# Patient Record
Sex: MALE | Race: WHITE | NOT HISPANIC OR LATINO | Employment: OTHER | URBAN - METROPOLITAN AREA
[De-identification: names, ages, dates, MRNs, and addresses within clinical notes are randomized per-mention and may not be internally consistent; named-entity substitution may affect disease eponyms.]

---

## 2017-01-01 ENCOUNTER — HOSPITAL ENCOUNTER (OUTPATIENT)
Dept: PET IMAGING | Facility: HOSPITAL | Age: 77
Discharge: HOME | End: 2017-06-02
Attending: INTERNAL MEDICINE | Admitting: INTERNAL MEDICINE

## 2017-01-01 ENCOUNTER — SPECIALTY PHARMACY (OUTPATIENT)
Dept: ONCOLOGY | Facility: HOSPITAL | Age: 77
End: 2017-01-01

## 2017-01-01 ENCOUNTER — OFFICE VISIT (OUTPATIENT)
Dept: ONCOLOGY | Facility: CLINIC | Age: 77
End: 2017-01-01

## 2017-01-01 ENCOUNTER — EDUCATION (OUTPATIENT)
Dept: ONCOLOGY | Facility: HOSPITAL | Age: 77
End: 2017-01-01

## 2017-01-01 ENCOUNTER — TELEPHONE (OUTPATIENT)
Dept: ONCOLOGY | Facility: CLINIC | Age: 77
End: 2017-01-01

## 2017-01-01 ENCOUNTER — RESULTS ENCOUNTER (OUTPATIENT)
Dept: ONCOLOGY | Facility: CLINIC | Age: 77
End: 2017-01-01

## 2017-01-01 ENCOUNTER — HOSPITAL ENCOUNTER (OUTPATIENT)
Dept: PET IMAGING | Facility: HOSPITAL | Age: 77
Discharge: HOME | End: 2017-01-26
Admitting: NURSE PRACTITIONER

## 2017-01-01 ENCOUNTER — HOSPITAL ENCOUNTER (OUTPATIENT)
Dept: PET IMAGING | Facility: HOSPITAL | Age: 77
Discharge: HOME | End: 2017-01-26

## 2017-01-01 ENCOUNTER — HOSPITAL ENCOUNTER (OUTPATIENT)
Dept: PET IMAGING | Facility: HOSPITAL | Age: 77
Discharge: HOME | End: 2017-06-02
Attending: INTERNAL MEDICINE

## 2017-01-01 VITALS
WEIGHT: 288 LBS | HEIGHT: 74 IN | RESPIRATION RATE: 20 BRPM | BODY MASS INDEX: 36.96 KG/M2 | TEMPERATURE: 97.9 F | DIASTOLIC BLOOD PRESSURE: 82 MMHG | SYSTOLIC BLOOD PRESSURE: 165 MMHG | HEART RATE: 61 BPM

## 2017-01-01 VITALS
BODY MASS INDEX: 35.04 KG/M2 | SYSTOLIC BLOOD PRESSURE: 140 MMHG | WEIGHT: 273 LBS | HEART RATE: 81 BPM | HEIGHT: 74 IN | DIASTOLIC BLOOD PRESSURE: 65 MMHG | TEMPERATURE: 97.3 F | RESPIRATION RATE: 18 BRPM

## 2017-01-01 VITALS
HEART RATE: 64 BPM | TEMPERATURE: 97.5 F | RESPIRATION RATE: 16 BRPM | DIASTOLIC BLOOD PRESSURE: 93 MMHG | BODY MASS INDEX: 37.47 KG/M2 | WEIGHT: 292 LBS | HEIGHT: 74 IN | SYSTOLIC BLOOD PRESSURE: 178 MMHG

## 2017-01-01 VITALS
BODY MASS INDEX: 36.32 KG/M2 | TEMPERATURE: 97.3 F | SYSTOLIC BLOOD PRESSURE: 146 MMHG | RESPIRATION RATE: 20 BRPM | HEIGHT: 74 IN | HEART RATE: 66 BPM | DIASTOLIC BLOOD PRESSURE: 78 MMHG | WEIGHT: 283 LBS

## 2017-01-01 VITALS
HEART RATE: 66 BPM | DIASTOLIC BLOOD PRESSURE: 73 MMHG | WEIGHT: 287 LBS | TEMPERATURE: 98 F | RESPIRATION RATE: 20 BRPM | BODY MASS INDEX: 36.83 KG/M2 | HEIGHT: 74 IN | SYSTOLIC BLOOD PRESSURE: 153 MMHG

## 2017-01-01 DIAGNOSIS — C83.14 MANTLE CELL LYMPHOMA OF LYMPH NODES OF AXILLA (HCC): ICD-10-CM

## 2017-01-01 DIAGNOSIS — C83.12 MANTLE CELL LYMPHOMA OF INTRATHORACIC LYMPH NODES (HCC): ICD-10-CM

## 2017-01-01 DIAGNOSIS — C83.14 MANTLE CELL LYMPHOMA OF LYMPH NODES OF AXILLA (HCC): Primary | ICD-10-CM

## 2017-01-01 DIAGNOSIS — C83.12 MANTLE CELL LYMPHOMA OF INTRATHORACIC LYMPH NODES (HCC): Primary | ICD-10-CM

## 2017-01-01 DIAGNOSIS — R91.1 PULMONARY NODULE, RIGHT: ICD-10-CM

## 2017-01-01 DIAGNOSIS — Z51.81 ENCOUNTER FOR THERAPEUTIC DRUG LEVEL MONITORING: ICD-10-CM

## 2017-01-01 LAB
ALBUMIN SERPL-MCNC: 4.3 G/DL (ref 3.2–4.8)
ALBUMIN SERPL-MCNC: 4.3 G/DL (ref 3.5–4.8)
ALBUMIN/GLOB SERPL: 2 G/DL (ref 1.5–2.5)
ALBUMIN/GLOB SERPL: 2.3 {RATIO} (ref 1.2–2.2)
ALBUMIN/GLOB SERPL: 2.5 {RATIO} (ref 1.2–2.2)
ALBUMIN/GLOB SERPL: 2.5 {RATIO} (ref 1.2–2.2)
ALP SERPL-CCNC: 70 IU/L (ref 39–117)
ALP SERPL-CCNC: 76 IU/L (ref 39–117)
ALP SERPL-CCNC: 80 IU/L (ref 39–117)
ALP SERPL-CCNC: 91 U/L (ref 25–100)
ALT SERPL W P-5'-P-CCNC: 24 U/L (ref 7–40)
ALT SERPL-CCNC: 11 IU/L (ref 0–44)
ALT SERPL-CCNC: 11 IU/L (ref 0–44)
ALT SERPL-CCNC: 9 IU/L (ref 0–44)
AMBIG ABBREV CMP14 DEFAULT: NORMAL
AMBIG ABBREV CMP14 DEFAULT: NORMAL
ANION GAP SERPL CALCULATED.3IONS-SCNC: 10 MMOL/L (ref 3–11)
AST SERPL-CCNC: 12 IU/L (ref 0–40)
AST SERPL-CCNC: 13 IU/L (ref 0–40)
AST SERPL-CCNC: 16 IU/L (ref 0–40)
AST SERPL-CCNC: 22 U/L (ref 0–33)
BASOPHILS # BLD AUTO: 0 X10E3/UL (ref 0–0.2)
BASOPHILS # BLD AUTO: 0.02 10*3/MM3 (ref 0–0.2)
BASOPHILS NFR BLD AUTO: 0 %
BASOPHILS NFR BLD AUTO: 0.3 % (ref 0–1)
BILIRUB SERPL-MCNC: 1.7 MG/DL (ref 0–1.2)
BILIRUB SERPL-MCNC: 1.9 MG/DL (ref 0.3–1.2)
BILIRUB SERPL-MCNC: 2.2 MG/DL (ref 0–1.2)
BILIRUB SERPL-MCNC: 2.8 MG/DL (ref 0–1.2)
BUN BLD-MCNC: 15 MG/DL (ref 9–23)
BUN SERPL-MCNC: 12 MG/DL (ref 8–27)
BUN SERPL-MCNC: 14 MG/DL (ref 8–27)
BUN SERPL-MCNC: 17 MG/DL (ref 8–27)
BUN/CREAT SERPL: 11 (ref 10–24)
BUN/CREAT SERPL: 11 (ref 10–24)
BUN/CREAT SERPL: 15 (ref 10–24)
BUN/CREAT SERPL: 15 (ref 7–25)
CALCIUM SERPL-MCNC: 9.1 MG/DL (ref 8.6–10.2)
CALCIUM SERPL-MCNC: 9.2 MG/DL (ref 8.6–10.2)
CALCIUM SERPL-MCNC: 9.3 MG/DL (ref 8.6–10.2)
CALCIUM SPEC-SCNC: 9.7 MG/DL (ref 8.7–10.4)
CHLORIDE SERPL-SCNC: 100 MMOL/L (ref 96–106)
CHLORIDE SERPL-SCNC: 101 MMOL/L (ref 96–106)
CHLORIDE SERPL-SCNC: 101 MMOL/L (ref 96–106)
CHLORIDE SERPL-SCNC: 103 MMOL/L (ref 99–109)
CO2 SERPL-SCNC: 22 MMOL/L (ref 18–29)
CO2 SERPL-SCNC: 24 MMOL/L (ref 18–29)
CO2 SERPL-SCNC: 24 MMOL/L (ref 18–29)
CO2 SERPL-SCNC: 28 MMOL/L (ref 20–31)
CORTIS AM PEAK SERPL-MCNC: 10.6 UG/DL (ref 6.2–19.4)
CREAT BLD-MCNC: 1 MG/DL (ref 0.6–1.3)
CREAT SERPL-MCNC: 1.13 MG/DL (ref 0.76–1.27)
CREAT SERPL-MCNC: 1.14 MG/DL (ref 0.76–1.27)
CREAT SERPL-MCNC: 1.26 MG/DL (ref 0.76–1.27)
DEPRECATED RDW RBC AUTO: 50.2 FL (ref 37–54)
EOSINOPHIL # BLD AUTO: 0 X10E3/UL (ref 0–0.4)
EOSINOPHIL # BLD AUTO: 0.1 10*3/MM3 (ref 0.1–0.3)
EOSINOPHIL # BLD AUTO: 0.1 X10E3/UL (ref 0–0.4)
EOSINOPHIL # BLD AUTO: 0.1 X10E3/UL (ref 0–0.4)
EOSINOPHIL NFR BLD AUTO: 0 %
EOSINOPHIL NFR BLD AUTO: 1 %
EOSINOPHIL NFR BLD AUTO: 1 %
EOSINOPHIL NFR BLD AUTO: 1.3 % (ref 0–3)
ERYTHROCYTE [DISTWIDTH] IN BLOOD BY AUTOMATED COUNT: 14.2 % (ref 11.3–14.5)
ERYTHROCYTE [DISTWIDTH] IN BLOOD BY AUTOMATED COUNT: 14.5 % (ref 12.3–15.4)
ERYTHROCYTE [DISTWIDTH] IN BLOOD BY AUTOMATED COUNT: 14.7 % (ref 12.3–15.4)
ERYTHROCYTE [DISTWIDTH] IN BLOOD BY AUTOMATED COUNT: 14.7 % (ref 12.3–15.4)
GFR SERPL CREATININE-BSD FRML MDRD: 73 ML/MIN/1.73
GLOBULIN SER CALC-MCNC: 1.7 G/DL (ref 1.5–4.5)
GLOBULIN SER CALC-MCNC: 1.7 G/DL (ref 1.5–4.5)
GLOBULIN SER CALC-MCNC: 1.9 G/DL (ref 1.5–4.5)
GLOBULIN UR ELPH-MCNC: 2.1 GM/DL
GLUCOSE BLD-MCNC: 80 MG/DL (ref 70–100)
GLUCOSE BLDC GLUCOMTR-MCNC: 108 MG/DL (ref 70–130)
GLUCOSE BLDC GLUCOMTR-MCNC: 89 MG/DL (ref 70–130)
GLUCOSE SERPL-MCNC: 121 MG/DL (ref 65–99)
GLUCOSE SERPL-MCNC: 125 MG/DL (ref 65–99)
GLUCOSE SERPL-MCNC: 91 MG/DL (ref 65–99)
HCT VFR BLD AUTO: 43.8 % (ref 37.5–51)
HCT VFR BLD AUTO: 45 % (ref 37.5–51)
HCT VFR BLD AUTO: 47.3 % (ref 38.9–50.9)
HCT VFR BLD AUTO: 48.5 % (ref 37.5–51)
HGB BLD-MCNC: 15.2 G/DL (ref 12.6–17.7)
HGB BLD-MCNC: 15.7 G/DL (ref 13.1–17.5)
HGB BLD-MCNC: 16 G/DL (ref 12.6–17.7)
HGB BLD-MCNC: 16.3 G/DL (ref 12.6–17.7)
IMM GRANULOCYTES # BLD: 0 X10E3/UL (ref 0–0.1)
IMM GRANULOCYTES # BLD: 0 X10E3/UL (ref 0–0.1)
IMM GRANULOCYTES # BLD: 0.02 10*3/MM3 (ref 0–0.03)
IMM GRANULOCYTES # BLD: 0.1 X10E3/UL (ref 0–0.1)
IMM GRANULOCYTES NFR BLD: 0 %
IMM GRANULOCYTES NFR BLD: 0 %
IMM GRANULOCYTES NFR BLD: 0.3 % (ref 0–0.6)
IMM GRANULOCYTES NFR BLD: 1 %
LYMPHOCYTES # BLD AUTO: 0.55 10*3/MM3 (ref 0.6–4.8)
LYMPHOCYTES # BLD AUTO: 0.6 X10E3/UL (ref 0.7–3.1)
LYMPHOCYTES # BLD AUTO: 1 X10E3/UL (ref 0.7–3.1)
LYMPHOCYTES # BLD AUTO: 1 X10E3/UL (ref 0.7–3.1)
LYMPHOCYTES NFR BLD AUTO: 12 %
LYMPHOCYTES NFR BLD AUTO: 13 %
LYMPHOCYTES NFR BLD AUTO: 7 %
LYMPHOCYTES NFR BLD AUTO: 7 % (ref 24–44)
MCH RBC QN AUTO: 31.9 PG (ref 26.6–33)
MCH RBC QN AUTO: 32.1 PG (ref 26.6–33)
MCH RBC QN AUTO: 32.1 PG (ref 27–31)
MCH RBC QN AUTO: 32.8 PG (ref 26.6–33)
MCHC RBC AUTO-ENTMCNC: 33.2 G/DL (ref 32–36)
MCHC RBC AUTO-ENTMCNC: 33.6 G/DL (ref 31.5–35.7)
MCHC RBC AUTO-ENTMCNC: 34.7 G/DL (ref 31.5–35.7)
MCHC RBC AUTO-ENTMCNC: 35.6 G/DL (ref 31.5–35.7)
MCV RBC AUTO: 92 FL (ref 79–97)
MCV RBC AUTO: 92 FL (ref 79–97)
MCV RBC AUTO: 96 FL (ref 79–97)
MCV RBC AUTO: 96.7 FL (ref 80–99)
MONOCYTES # BLD AUTO: 0.2 X10E3/UL (ref 0.1–0.9)
MONOCYTES # BLD AUTO: 0.3 X10E3/UL (ref 0.1–0.9)
MONOCYTES # BLD AUTO: 0.56 10*3/MM3 (ref 0–1)
MONOCYTES # BLD AUTO: 0.6 X10E3/UL (ref 0.1–0.9)
MONOCYTES NFR BLD AUTO: 2 %
MONOCYTES NFR BLD AUTO: 4 %
MONOCYTES NFR BLD AUTO: 7 %
MONOCYTES NFR BLD AUTO: 7.1 % (ref 0–12)
MORPHOLOGY BLD-IMP: (no result)
NEUTROPHILS # BLD AUTO: 6.2 X10E3/UL (ref 1.4–7)
NEUTROPHILS # BLD AUTO: 6.6 10*3/MM3 (ref 1.5–8.3)
NEUTROPHILS # BLD AUTO: 7.2 X10E3/UL (ref 1.4–7)
NEUTROPHILS # BLD AUTO: 7.3 X10E3/UL (ref 1.4–7)
NEUTROPHILS NFR BLD AUTO: 83 %
NEUTROPHILS NFR BLD AUTO: 84 %
NEUTROPHILS NFR BLD AUTO: 84 % (ref 41–71)
NEUTROPHILS NFR BLD AUTO: 85 %
PLATELET # BLD AUTO: 123 X10E3/UL (ref 150–379)
PLATELET # BLD AUTO: 125 X10E3/UL (ref 150–379)
PLATELET # BLD AUTO: 126 X10E3/UL (ref 150–379)
PLATELET # BLD AUTO: 160 10*3/MM3 (ref 150–450)
PMV BLD AUTO: 10 FL (ref 6–12)
POTASSIUM BLD-SCNC: 4.4 MMOL/L (ref 3.5–5.5)
POTASSIUM SERPL-SCNC: 3.9 MMOL/L (ref 3.5–5.2)
POTASSIUM SERPL-SCNC: 4 MMOL/L (ref 3.5–5.2)
POTASSIUM SERPL-SCNC: 4.2 MMOL/L (ref 3.5–5.2)
PROT SERPL-MCNC: 6 G/DL (ref 6–8.5)
PROT SERPL-MCNC: 6 G/DL (ref 6–8.5)
PROT SERPL-MCNC: 6.2 G/DL (ref 6–8.5)
PROT SERPL-MCNC: 6.4 G/DL (ref 5.7–8.2)
RBC # BLD AUTO: 4.76 X10E6/UL (ref 4.14–5.8)
RBC # BLD AUTO: 4.88 X10E6/UL (ref 4.14–5.8)
RBC # BLD AUTO: 4.89 10*6/MM3 (ref 4.2–5.76)
RBC # BLD AUTO: 5.07 X10E6/UL (ref 4.14–5.8)
SODIUM BLD-SCNC: 141 MMOL/L (ref 132–146)
SODIUM SERPL-SCNC: 142 MMOL/L (ref 134–144)
SODIUM SERPL-SCNC: 143 MMOL/L (ref 134–144)
SODIUM SERPL-SCNC: 144 MMOL/L (ref 134–144)
TSH SERPL DL<=0.005 MIU/L-ACNC: 10.96 UIU/ML (ref 0.45–4.5)
TSH SERPL DL<=0.005 MIU/L-ACNC: 7.09 UIU/ML (ref 0.45–4.5)
WBC # BLD AUTO: 7.4 X10E3/UL (ref 3.4–10.8)
WBC # BLD AUTO: 8.5 X10E3/UL (ref 3.4–10.8)
WBC # BLD AUTO: 8.8 X10E3/UL (ref 3.4–10.8)
WBC NRBC COR # BLD: 7.85 10*3/MM3 (ref 3.5–10.8)

## 2017-01-01 PROCEDURE — 78816 PET IMAGE W/CT FULL BODY: CPT

## 2017-01-01 PROCEDURE — 99214 OFFICE O/P EST MOD 30 MIN: CPT | Performed by: INTERNAL MEDICINE

## 2017-01-01 PROCEDURE — A9552 F18 FDG: HCPCS | Performed by: INTERNAL MEDICINE

## 2017-01-01 PROCEDURE — 82962 GLUCOSE BLOOD TEST: CPT

## 2017-01-01 PROCEDURE — 85025 COMPLETE CBC W/AUTO DIFF WBC: CPT | Performed by: INTERNAL MEDICINE

## 2017-01-01 PROCEDURE — 78815 PET IMAGE W/CT SKULL-THIGH: CPT

## 2017-01-01 PROCEDURE — A9552 F18 FDG: HCPCS | Performed by: NURSE PRACTITIONER

## 2017-01-01 PROCEDURE — 0 FLUDEOXYGLUCOSE F18 SOLUTION: Performed by: NURSE PRACTITIONER

## 2017-01-01 PROCEDURE — 99214 OFFICE O/P EST MOD 30 MIN: CPT | Performed by: NURSE PRACTITIONER

## 2017-01-01 PROCEDURE — 80053 COMPREHEN METABOLIC PANEL: CPT | Performed by: INTERNAL MEDICINE

## 2017-01-01 PROCEDURE — 0 FLUDEOXYGLUCOSE F18 SOLUTION: Performed by: INTERNAL MEDICINE

## 2017-01-01 RX ORDER — ONDANSETRON HYDROCHLORIDE 8 MG/1
8 TABLET, FILM COATED ORAL EVERY 8 HOURS PRN
Qty: 30 TABLET | Refills: 5 | Status: SHIPPED | OUTPATIENT
Start: 2017-01-01

## 2017-01-01 RX ORDER — DIPHENHYDRAMINE, LIDOCAINE, NYSTATIN
KIT ORAL
Qty: 240 ML | Refills: 3 | Status: SHIPPED | OUTPATIENT
Start: 2017-01-01

## 2017-01-01 RX ADMIN — FLUDEOXYGLUCOSE F18 1 DOSE: 300 INJECTION INTRAVENOUS at 09:29

## 2017-01-01 RX ADMIN — FLUDEOXYGLUCOSE F18 1 DOSE: 300 INJECTION INTRAVENOUS at 12:18

## 2017-01-18 NOTE — TELEPHONE ENCOUNTER
----- Message from Stephanie Buchanan sent at 1/18/2017  2:30 PM EST -----  Regarding: nasir perales/ lab work  Contact: [phone]  Please call.  When is the lab work to be done?  During the scan?  Prior to coming in to the office?  Or in office prior to office visit.  He is very concerned.

## 2017-01-18 NOTE — TELEPHONE ENCOUNTER
Spoke with  Paul. He was confused about when he should have his lab work done. The paperwork he was given did have a written date the same day as his scheduled PET scan. I told him that he should have his lab drawn before the PET scan as sometimes the radiology dept needs those results prior to any testing. He does know that his next office appt is 2/2/17. He will call if any questions or concerns.

## 2017-02-02 NOTE — PROGRESS NOTES
CHIEF COMPLAINT: Vertex scalp lesion    Problem List:  Oncology/Hematology History    1. Stage IV mantle cell lymphoma: PET scan 01/28/2015 showing diffuse  extensive adenopathy from the lower neck to the proximal thighs with massive  enlargement of the spleen as well. Bone marrow shows nodular involvement of  about 33% of the bone marrow. Echocardiogram 02/02/2015 ejection fraction  greater then 55%. Colonoscopy 02/03/2015 revealed diverticulosis and some  internal hemorrhoids but no other abnormalities. Left axillary node core biopsy  01/14/2015 diagnostic for pleomorphic variant of mantle cell lymphoma.   a) Started bendamustine and Rituxan 02/05/2015, day 2 on 02/06/2015.  b) After 2 courses of bendamustine and Rituxan PET scan of 03/20/2015 showed  virtual complete remission save for a new very small 1 cm nodule posterior  medial right mid to lower lung slightly hypermetabolic but below malignant  thresholds and could be inflammatory, and the rest of his disease is  essentially gone. Saw Dr. Key and while not dismissing the possibility of  transplant out of hand in general it would be a relatively high-risk situation  in this 74-year-old, and the patient, after hearing in great detail would be  involved with the transplant, does not want to consider that.   c) PET scan 06/02/2015 following 4 courses of bendamustine and Rituxan  entirely negative.   d) Repeat bone marrow 06/05/2015 showed no evidence of residual disease.  e) Restaging CT of the neck, chest, abdomen, and pelvis 02/24/2016 showed some  sinusitis and cholelithiasis but no lymphoma. Hemoglobin 16.3, with white count  9200, platelets 155,000, with normal differential, and creatinine of 1.1, GFR  greater than 59, AST 25, ALT 51, alkaline phosphatase 103, and normal  Electrolytes.  f.)  CAT scans October 2016 negative despite better complaints of diffuse bone pain on maintenance Rituxan.  October 2016 PET scan also negative for malignancy.  Small  nodule in the lung base that needs to be followed.  This was seen on prior imaging as mentioned above.  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  10/24/2014 bone marrow biopsy was negative with normal cellular bone marrow with maturing trilineage hematopoiesis.  No morphologic, immunohistochemical or flow cytometric evidence of lymphoma.  Cytogenetics revealed normal male karyotype.         HISTORY OF PRESENT ILLNESS:  The patient is a 76 y.o. male, here for follow up on management of mantle cell lymphoma.  Vertex scalp lesion is the only thing visible on current PET scanning images which I reviewed and discussed with the patient today.  He's not had blood work done recently.  Otherwise feeling fine now that he is off the Rituxan.      Past Medical History   Diagnosis Date   • Aches      Smokes marijuana to help with chronic insomnia and aches that occur after the Rituxan   • Cataract      Hx of cataracts.   • Chronic bronchitis    • Chronic insomnia      Smokes marijuana to help with chronic insomnia and aches that occur after the Rituxan. Has tried sleeping medicines and they do not help.Marijuana helps, self medicates.   • Disease of thyroid gland    • Glaucoma      And therefore cannot take Benadryl   • History of hypothyroidism      On Synthroid   • Hx of renal insufficiency syndrome    • Hyperlipidemia    • Hypertension      Hx of   • Mantle cell lymphoma      Axilla and upper limb   • Myocardial infarction      Apparent silent MI in the past according to patient's  report to him of prior EKGs and cardiac cath showing no need for stenting but diffuse less than 50% narrowing.   • PVD (peripheral vascular disease)      With gangrene right hand after right brachial vascular surgery bypass with subsequent redo of that bypass in [place] in 1995.   • Renal insufficiency      Hx of. With current normal renal function pretreatment.   • Sinus congestion      Occasional without fevers or chills     Past Surgical History   Procedure Laterality Date   • Colonoscopy     • Bone marrow biopsy     • Tonsillectomy     • Vascular surgery Right 1995     Brachial vascular surgery bypass with subsequent redo of that bypass in [place] in 1995   • Hand surgery Left      Hx of left hand reattachment, traumatic removal, and subsequently reattachment.       Allergies   Allergen Reactions   • Antihistamines, Diphenhydramine-Type    • Benadryl [Diphenhydramine Hcl (Sleep)]      Cannot take Benadryl or many other sleeping meds because of glaucoma.   • Codeine    • Diphenhydramine        Family History and Social History reviewed and changed as necessary      REVIEW OF SYSTEM:   Review of Systems   Constitutional: Negative for appetite change, chills, diaphoresis, fatigue, fever and unexpected weight change.   HENT:   Negative for mouth sores, sore throat and trouble swallowing.    Eyes: Negative for icterus.   Respiratory: Negative for cough, hemoptysis and shortness of breath.    Cardiovascular: Negative for chest pain, leg swelling and palpitations.   Gastrointestinal: Negative for abdominal distention, abdominal pain, blood in stool, constipation, diarrhea, nausea and vomiting.   Endocrine: Negative for hot flashes.   Genitourinary: Negative for bladder incontinence, difficulty urinating, dysuria, frequency and hematuria.    Musculoskeletal: Negative for gait problem, neck pain and neck stiffness.   Skin: Negative for rash.   Neurological: Negative for dizziness, gait problem, headaches,  "light-headedness and numbness.   Hematological: Negative for adenopathy. Does not bruise/bleed easily.   Psychiatric/Behavioral: Negative for depression. The patient is not nervous/anxious.    All other systems reviewed and are negative.       PHYSICAL EXAM    Vitals:    02/02/17 1326   BP: 140/65   Pulse: 81   Resp: 18   Temp: 97.3 °F (36.3 °C)   Weight: 273 lb (124 kg)   Height: 73.5\" (186.7 cm)     Constitutional: Appears well-developed and well-nourished. No distress.   ECOG: (1) Restricted in physically strenuous activity, ambulatory and able to do work of light nature  HENT:   Head: Normocephalic.   Mouth/Throat: Oropharynx is clear and moist.   Eyes: Conjunctivae are normal. Pupils are equal, round, and reactive to light. No scleral icterus.   Neck: Neck supple. No JVD present. No thyromegaly present.   Cardiovascular: Normal rate, regular rhythm and normal heart sounds.    Pulmonary/Chest: Breath sounds normal. No respiratory distress.   Abdominal: Soft. Exhibits no distension and no mass. There is no hepatosplenomegaly. There is no tenderness. There is no rebound and no guarding.   Musculoskeletal:Exhibits no edema, tenderness or deformity.   Neurological: Alert and oriented to person, place, and time. Exhibits normal muscle tone.   Skin: No ecchymosis, no petechiae and no rash noted. Not diaphoretic. No cyanosis. Nails show no clubbing.  Right of midline vertex scalp lesion about half the size of a ping-pong ball crusting and flaking with some central bleeding.    Psychiatric: Normal mood and affect.   Vitals reviewed.      Hospital Outpatient Visit on 01/26/2017   Component Date Value Ref Range Status   • Glucose 01/26/2017 108  70 - 130 mg/dL Final       Assessment/Plan     1.  Scalp lesion  2. Mantle cell lymphoma    Discussion: We'll get him to dermatology to get this large scalp lesion excised.  It is possible that this is lymphoma but I suspect not but nonetheless it needs to be removed and it is " growing in his significant in size.  Otherwise we'll plan on getting his blood count and chemistry today as well as prior to return in 4 months and check his PET scan prior to return in 4 months.  His fatigue is much better and his overall wall of the life and mood is dramatically improved off Rituxan.       Errors in dictation may reflect use of voice recognition software and not all errors in transcription may have been detected prior to signing.    Manuel Sosa MD    02/02/2017

## 2017-06-08 PROBLEM — C83.12: Status: ACTIVE | Noted: 2017-01-01

## 2017-06-08 NOTE — PROGRESS NOTES
CHIEF COMPLAINT: Follow-up for mantle cell lymphoma    Problem List:  Oncology/Hematology History    1. Stage IV mantle cell lymphoma: PET scan 01/28/2015 showing diffuse  extensive adenopathy from the lower neck to the proximal thighs with massive  enlargement of the spleen as well. Bone marrow shows nodular involvement of  about 33% of the bone marrow. Echocardiogram 02/02/2015 ejection fraction  greater then 55%. Colonoscopy 02/03/2015 revealed diverticulosis and some  internal hemorrhoids but no other abnormalities. Left axillary node core biopsy  01/14/2015 diagnostic for pleomorphic variant of mantle cell lymphoma.   a) Started bendamustine and Rituxan 02/05/2015, day 2 on 02/06/2015.  b) After 2 courses of bendamustine and Rituxan PET scan of 03/20/2015 showed  virtual complete remission save for a new very small 1 cm nodule posterior  medial right mid to lower lung slightly hypermetabolic but below malignant  thresholds and could be inflammatory, and the rest of his disease is  essentially gone. Saw Dr. Key and while not dismissing the possibility of  transplant out of hand in general it would be a relatively high-risk situation  in this 74-year-old, and the patient, after hearing in great detail would be  involved with the transplant, does not want to consider that.   c) PET scan 06/02/2015 following 4 courses of bendamustine and Rituxan  entirely negative.   d) Repeat bone marrow 06/05/2015 showed no evidence of residual disease.  e) Restaging CT of the neck, chest, abdomen, and pelvis 02/24/2016 showed some  sinusitis and cholelithiasis but no lymphoma. Hemoglobin 16.3, with white count  9200, platelets 155,000, with normal differential, and creatinine of 1.1, GFR  greater than 59, AST 25, ALT 51, alkaline phosphatase 103, and normal  Electrolytes.  f.)  CAT scans October 2016 negative despite bitter complaints of diffuse bone pain on maintenance Rituxan.  October 2016 PET scan also negative for  malignancy.  Small nodule in the lung base that needs to be followed.  This was seen on prior imaging as mentioned above.  G.) PET/CT 1/26/17 shows a small vertex right of midline scalp abnormality and otherwise no abnormalities including resolution of prior right lower lobe lung nodule  H.) PET/CT with progression of disease, new area of recurrence in the paraesophageal region and subcarinal region.    2.  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  10/24/2014 bone marrow biopsy was negative with normal cellular bone marrow with maturing trilineage hematopoiesis.  No morphologic, immunohistochemical or flow cytometric evidence of lymphoma.  Cytogenetics revealed normal male karyotype.      6/2/2017 Imaging    PET/CT IMPRESSION:  Progression of disease with new area of recurrence  identified in the paraesophageal region and subcarinal region where  there is a large hypermetabolic mass present. The remainder of the  PET/CT scan is stable and unremarkable.      6/2/2017 Progression    On PET/CT         HISTORY OF PRESENT ILLNESS:  The patient is a 76 y.o. male, here for follow up on management of mantle cell lymphoma.  The patient states that since we saw him last he has been feeling well.  He denies any new pain.  He states that he has been sleeping well.  He has an occasional mild course of night sweats but not very often and these are not drenching.  No fevers or chills, no illnesses or infections since we saw him last.  He is eating well, no change in his bowel or bladder habits.      Past Medical History:   Diagnosis Date   • Aches     Smokes marijuana to help with chronic insomnia and  aches that occur after the Rituxan   • Cataract     Hx of cataracts.   • Chronic bronchitis    • Chronic insomnia     Smokes marijuana to help with chronic insomnia and aches that occur after the Rituxan. Has tried sleeping medicines and they do not help.Marijuana helps, self medicates.   • Disease of thyroid gland    • Glaucoma     And therefore cannot take Benadryl   • History of hypothyroidism     On Synthroid   • Hx of renal insufficiency syndrome    • Hyperlipidemia    • Hypertension     Hx of   • Mantle cell lymphoma     Axilla and upper limb   • Myocardial infarction     Apparent silent MI in the past according to patient's report to him of prior EKGs and cardiac cath showing no need for stenting but diffuse less than 50% narrowing.   • PVD (peripheral vascular disease)     With gangrene right hand after right brachial vascular surgery bypass with subsequent redo of that bypass in [place] in 1995.   • Renal insufficiency     Hx of. With current normal renal function pretreatment.   • Sinus congestion     Occasional without fevers or chills     Past Surgical History:   Procedure Laterality Date   • BONE MARROW BIOPSY     • COLONOSCOPY     • HAND SURGERY Left     Hx of left hand reattachment, traumatic removal, and subsequently reattachment.   • TONSILLECTOMY     • VASCULAR SURGERY Right 1995    Brachial vascular surgery bypass with subsequent redo of that bypass in [place] in 1995       Allergies   Allergen Reactions   • Antihistamines, Diphenhydramine-Type    • Benadryl [Diphenhydramine Hcl (Sleep)]      Cannot take Benadryl or many other sleeping meds because of glaucoma.   • Codeine    • Diphenhydramine        Family History and Social History reviewed and changed as necessary      REVIEW OF SYSTEM:   Review of Systems   Constitutional: Negative for appetite change, chills, diaphoresis, fatigue, fever and unexpected weight change.   HENT:   Negative for mouth sores, sore throat and trouble swallowing.  "   Eyes: Negative for icterus.   Respiratory: Negative for cough, hemoptysis and shortness of breath.    Cardiovascular: Negative for chest pain, leg swelling and palpitations.   Gastrointestinal: Negative for abdominal distention, abdominal pain, blood in stool, constipation, diarrhea, nausea and vomiting.   Endocrine: Negative for hot flashes.   Genitourinary: Negative for bladder incontinence, difficulty urinating, dysuria, frequency and hematuria.    Musculoskeletal: Negative for gait problem, neck pain and neck stiffness.   Skin: Negative for rash.   Neurological: Negative for dizziness, gait problem, headaches, light-headedness and numbness.   Hematological: Negative for adenopathy. Does not bruise/bleed easily.   Psychiatric/Behavioral: Negative for depression. The patient is not nervous/anxious.    All other systems reviewed and are negative.       PHYSICAL EXAM    Vitals:    06/08/17 1336   BP: 153/73   Pulse: 66   Resp: 20   Temp: 98 °F (36.7 °C)   Weight: 287 lb (130 kg)   Height: 73.5\" (186.7 cm)     Constitutional: Appears well-developed and well-nourished. No distress.   ECOG: (0) Fully active, able to carry on all predisease performance without restriction  HENT:   Head: Normocephalic.   Mouth/Throat: Oropharynx is clear and moist.   Eyes: Conjunctivae are normal. Pupils are equal, round, and reactive to light. No scleral icterus.   Neck: Neck supple. No JVD present. No thyromegaly present.   Cardiovascular: Normal rate, regular rhythm and normal heart sounds.    Pulmonary/Chest: Breath sounds normal. No respiratory distress.   Abdominal: Soft. Exhibits no distension and no mass. There is no hepatosplenomegaly. There is no tenderness. There is no rebound and no guarding.   Musculoskeletal:Exhibits no edema, tenderness.   Nodes: All nodes are nonpalpable.    Neurological: Alert and oriented to person, place, and time.   Skin: No ecchymosis, no petechiae and no rash noted. Not diaphoretic. No cyanosis. " Nails show no clubbing, nails are thickened with fungus.   Psychiatric: Normal mood and affect.   Vitals reviewed.    Recent Results (from the past 168 hour(s))   POC Glucose Fingerstick    Collection Time: 06/02/17 12:11 PM   Result Value Ref Range    Glucose 89 70 - 130 mg/dL   Comprehensive Metabolic Panel    Collection Time: 06/02/17  3:29 PM   Result Value Ref Range    Glucose 80 70 - 100 mg/dL    BUN 15 9 - 23 mg/dL    Creatinine 1.00 0.60 - 1.30 mg/dL    Sodium 141 132 - 146 mmol/L    Potassium 4.4 3.5 - 5.5 mmol/L    Chloride 103 99 - 109 mmol/L    CO2 28.0 20.0 - 31.0 mmol/L    Calcium 9.7 8.7 - 10.4 mg/dL    Total Protein 6.4 5.7 - 8.2 g/dL    Albumin 4.30 3.20 - 4.80 g/dL    ALT (SGPT) 24 7 - 40 U/L    AST (SGOT) 22 0 - 33 U/L    Alkaline Phosphatase 91 25 - 100 U/L    Total Bilirubin 1.9 (H) 0.3 - 1.2 mg/dL    eGFR Non African Amer 73 >60 mL/min/1.73    Globulin 2.1 gm/dL    A/G Ratio 2.0 1.5 - 2.5 g/dL    BUN/Creatinine Ratio 15.0 7.0 - 25.0    Anion Gap 10.0 3.0 - 11.0 mmol/L   CBC Auto Differential    Collection Time: 06/02/17  3:29 PM   Result Value Ref Range    WBC 7.85 3.50 - 10.80 10*3/mm3    RBC 4.89 4.20 - 5.76 10*6/mm3    Hemoglobin 15.7 13.1 - 17.5 g/dL    Hematocrit 47.3 38.9 - 50.9 %    MCV 96.7 80.0 - 99.0 fL    MCH 32.1 (H) 27.0 - 31.0 pg    MCHC 33.2 32.0 - 36.0 g/dL    RDW 14.2 11.3 - 14.5 %    RDW-SD 50.2 37.0 - 54.0 fl    MPV 10.0 6.0 - 12.0 fL    Platelets 160 150 - 450 10*3/mm3    Neutrophil % 84.0 (H) 41.0 - 71.0 %    Lymphocyte % 7.0 (L) 24.0 - 44.0 %    Monocyte % 7.1 0.0 - 12.0 %    Eosinophil % 1.3 0.0 - 3.0 %    Basophil % 0.3 0.0 - 1.0 %    Immature Grans % 0.3 0.0 - 0.6 %    Neutrophils, Absolute 6.60 1.50 - 8.30 10*3/mm3    Lymphocytes, Absolute 0.55 (L) 0.60 - 4.80 10*3/mm3    Monocytes, Absolute 0.56 0.00 - 1.00 10*3/mm3    Eosinophils, Absolute 0.10 0.10 - 0.30 10*3/mm3    Basophils, Absolute 0.02 0.00 - 0.20 10*3/mm3    Immature Grans, Absolute 0.02 0.00 - 0.03 10*3/mm3          Assessment/Plan     1. Recurrent mantle cell lymphoma:  Patient's current PET/CT shows recurrence of disease in the paraesophageal/subcarinal region.  He is asymptomatic labs are unremarkable with no anemia or thrombocytopenia, white count is normal.  I will check his uric acid level and LDH today.  We discussed treatment options and will plan on beginning treatment in the next week or so with Ibrutinib 560 mg daily.  Side effects were discussed including but not limited to arrhythmias, diarrhea, neutropenia, infection, bleeding, nausea, fatigue.  He was provided written information and will also will be contacted by our doctorate of pharmacy for further education.  We will see him back in 1 week for follow-up and if he has the oral medication he can begin at that time, he will meet with Dr. Sosa to discuss this further as Dr. Sosa was out this week on vacation and the patient would like to meet with him in regards to his progression of disease.  I did discuss plan of care today with one of his partners, Dr. Malik Clarke.    2.  Squamous cell carcinoma scalp: This was removed in March with Mohs procedure.    20 minutes at today's 25 minute appointment was spent in counseling and education regarding the above and in coordination of care.  Edna Pelaez, APRN    06/08/2017

## 2017-06-15 NOTE — PROGRESS NOTES
CHIEF COMPLAINT: Mantle cell lymphoma    Problem List:  Oncology/Hematology History    1. Stage IV mantle cell lymphoma: PET scan 01/28/2015 showing diffuse  extensive adenopathy from the lower neck to the proximal thighs with massive  enlargement of the spleen as well. Bone marrow shows nodular involvement of  about 33% of the bone marrow. Echocardiogram 02/02/2015 ejection fraction  greater then 55%. Colonoscopy 02/03/2015 revealed diverticulosis and some  internal hemorrhoids but no other abnormalities. Left axillary node core biopsy  01/14/2015 diagnostic for pleomorphic variant of mantle cell lymphoma.   a) Started bendamustine and Rituxan 02/05/2015, day 2 on 02/06/2015.  b) After 2 courses of bendamustine and Rituxan PET scan of 03/20/2015 showed  virtual complete remission save for a new very small 1 cm nodule posterior  medial right mid to lower lung slightly hypermetabolic but below malignant  thresholds and could be inflammatory, and the rest of his disease is  essentially gone. Saw Dr. Key and while not dismissing the possibility of  transplant out of hand in general it would be a relatively high-risk situation  in this 74-year-old, and the patient, after hearing in great detail would be  involved with the transplant, does not want to consider that.   c) PET scan 06/02/2015 following 4 courses of bendamustine and Rituxan  entirely negative.   d) Repeat bone marrow 06/05/2015 showed no evidence of residual disease.  e) Restaging CT of the neck, chest, abdomen, and pelvis 02/24/2016 showed some  sinusitis and cholelithiasis but no lymphoma. Hemoglobin 16.3, with white count  9200, platelets 155,000, with normal differential, and creatinine of 1.1, GFR  greater than 59, AST 25, ALT 51, alkaline phosphatase 103, and normal  Electrolytes.  f.)  CAT scans October 2016 negative despite bitter complaints of diffuse bone pain on maintenance Rituxan.  October 2016 PET scan also negative for malignancy.  Small  nodule in the lung base that needs to be followed.  This was seen on prior imaging as mentioned above.  G.) PET/CT 1/26/17 shows a small vertex right of midline scalp abnormality and otherwise no abnormalities including resolution of prior right lower lobe lung nodule  H.) PET/CT with progression of disease, new area of recurrence in the paraesophageal region and subcarinal region.    2.  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  10/24/2014 bone marrow biopsy was negative with normal cellular bone marrow with maturing trilineage hematopoiesis.  No morphologic, immunohistochemical or flow cytometric evidence of lymphoma.  Cytogenetics revealed normal male karyotype.      6/2/2017 Imaging    PET/CT IMPRESSION:  Progression of disease with new area of recurrence  identified in the paraesophageal region and subcarinal region where  there is a large hypermetabolic mass present. The remainder of the  PET/CT scan is stable and unremarkable.      6/2/2017 Progression    On PET/CT         HISTORY OF PRESENT ILLNESS:  The patient is a 76 y.o. male, here for follow up on management of Recurrent mantle cell lymphoma.  He is asymptomatic.  He is still working on getting his ibrutinib coverage sorted out.  Past Medical History:   Diagnosis Date   • Aches     Smokes marijuana to help with chronic insomnia and aches that occur after the Rituxan   • Cataract     Hx of cataracts.   • Chronic bronchitis    • Chronic insomnia     Smokes marijuana to help with chronic insomnia and aches that occur after the Rituxan. Has tried sleeping medicines and they do not help.Marijuana helps, self medicates.   • Disease  of thyroid gland    • Glaucoma     And therefore cannot take Benadryl   • History of hypothyroidism     On Synthroid   • Hx of renal insufficiency syndrome    • Hyperlipidemia    • Hypertension     Hx of   • Mantle cell lymphoma     Axilla and upper limb   • Myocardial infarction     Apparent silent MI in the past according to patient's report to him of prior EKGs and cardiac cath showing no need for stenting but diffuse less than 50% narrowing.   • PVD (peripheral vascular disease)     With gangrene right hand after right brachial vascular surgery bypass with subsequent redo of that bypass in [place] in 1995.   • Renal insufficiency     Hx of. With current normal renal function pretreatment.   • Sinus congestion     Occasional without fevers or chills     Past Surgical History:   Procedure Laterality Date   • BONE MARROW BIOPSY     • COLONOSCOPY     • HAND SURGERY Left     Hx of left hand reattachment, traumatic removal, and subsequently reattachment.   • TONSILLECTOMY     • VASCULAR SURGERY Right 1995    Brachial vascular surgery bypass with subsequent redo of that bypass in [place] in 1995       Allergies   Allergen Reactions   • Antihistamines, Diphenhydramine-Type    • Benadryl [Diphenhydramine Hcl (Sleep)]      Cannot take Benadryl or many other sleeping meds because of glaucoma.   • Codeine    • Diphenhydramine        Family History and Social History reviewed and changed as necessary      REVIEW OF SYSTEM:   Review of Systems   Constitutional: Negative for appetite change, chills, diaphoresis, fatigue, fever and unexpected weight change.   HENT:   Negative for mouth sores, sore throat and trouble swallowing.    Eyes: Negative for icterus.   Respiratory: Negative for cough, hemoptysis and shortness of breath.    Cardiovascular: Negative for chest pain, leg swelling and palpitations.   Gastrointestinal: Negative for abdominal distention, abdominal pain, blood in stool, constipation, diarrhea, nausea and  "vomiting.   Endocrine: Negative for hot flashes.   Genitourinary: Negative for bladder incontinence, difficulty urinating, dysuria, frequency and hematuria.    Musculoskeletal: Negative for gait problem, neck pain and neck stiffness.   Skin: Negative for rash.   Neurological: Negative for dizziness, gait problem, headaches, light-headedness and numbness.   Hematological: Negative for adenopathy. Does not bruise/bleed easily.   Psychiatric/Behavioral: Negative for depression. The patient is not nervous/anxious.    All other systems reviewed and are negative.       PHYSICAL EXAM    Vitals:    06/15/17 1006   BP: 165/82   Pulse: 61   Resp: 20   Temp: 97.9 °F (36.6 °C)   Weight: 288 lb (131 kg)   Height: 73.5\" (186.7 cm)     Constitutional: Appears well-developed and well-nourished. No distress.   ECOG: (0) Fully active, able to carry on all predisease performance without restriction  HENT:   Head: Normocephalic.   Mouth/Throat: Oropharynx is clear and moist.   Eyes: Conjunctivae are normal. Pupils are equal, round, and reactive to light. No scleral icterus.   Neck: Neck supple. No JVD present. No thyromegaly present.   Cardiovascular: Normal rate, regular rhythm and normal heart sounds.    Pulmonary/Chest: Breath sounds normal. No respiratory distress.   Abdominal: Soft. Exhibits no distension and no mass. There is no hepatosplenomegaly. There is no tenderness. There is no rebound and no guarding.   Musculoskeletal:Exhibits no edema, tenderness or deformity.   Neurological: Alert and oriented to person, place, and time. Exhibits normal muscle tone.   Skin: No ecchymosis, no petechiae and no rash noted. Not diaphoretic. No cyanosis. Nails show no clubbing.   Psychiatric: Normal mood and affect.   Vitals reviewed.      Hospital Outpatient Visit on 06/02/2017   Component Date Value Ref Range Status   • Glucose 06/02/2017 80  70 - 100 mg/dL Final   • BUN 06/02/2017 15  9 - 23 mg/dL Final   • Creatinine 06/02/2017 1.00  " 0.60 - 1.30 mg/dL Final   • Sodium 06/02/2017 141  132 - 146 mmol/L Final   • Potassium 06/02/2017 4.4  3.5 - 5.5 mmol/L Final   • Chloride 06/02/2017 103  99 - 109 mmol/L Final   • CO2 06/02/2017 28.0  20.0 - 31.0 mmol/L Final   • Calcium 06/02/2017 9.7  8.7 - 10.4 mg/dL Final   • Total Protein 06/02/2017 6.4  5.7 - 8.2 g/dL Final   • Albumin 06/02/2017 4.30  3.20 - 4.80 g/dL Final   • ALT (SGPT) 06/02/2017 24  7 - 40 U/L Final   • AST (SGOT) 06/02/2017 22  0 - 33 U/L Final   • Alkaline Phosphatase 06/02/2017 91  25 - 100 U/L Final   • Total Bilirubin 06/02/2017 1.9* 0.3 - 1.2 mg/dL Final   • eGFR Non African Amer 06/02/2017 73  >60 mL/min/1.73 Final   • Globulin 06/02/2017 2.1  gm/dL Final   • A/G Ratio 06/02/2017 2.0  1.5 - 2.5 g/dL Final   • BUN/Creatinine Ratio 06/02/2017 15.0  7.0 - 25.0 Final   • Anion Gap 06/02/2017 10.0  3.0 - 11.0 mmol/L Final   • WBC 06/02/2017 7.85  3.50 - 10.80 10*3/mm3 Final   • RBC 06/02/2017 4.89  4.20 - 5.76 10*6/mm3 Final   • Hemoglobin 06/02/2017 15.7  13.1 - 17.5 g/dL Final   • Hematocrit 06/02/2017 47.3  38.9 - 50.9 % Final   • MCV 06/02/2017 96.7  80.0 - 99.0 fL Final   • MCH 06/02/2017 32.1* 27.0 - 31.0 pg Final   • MCHC 06/02/2017 33.2  32.0 - 36.0 g/dL Final   • RDW 06/02/2017 14.2  11.3 - 14.5 % Final   • RDW-SD 06/02/2017 50.2  37.0 - 54.0 fl Final   • MPV 06/02/2017 10.0  6.0 - 12.0 fL Final   • Platelets 06/02/2017 160  150 - 450 10*3/mm3 Final   • Neutrophil % 06/02/2017 84.0* 41.0 - 71.0 % Final   • Lymphocyte % 06/02/2017 7.0* 24.0 - 44.0 % Final   • Monocyte % 06/02/2017 7.1  0.0 - 12.0 % Final   • Eosinophil % 06/02/2017 1.3  0.0 - 3.0 % Final   • Basophil % 06/02/2017 0.3  0.0 - 1.0 % Final   • Immature Grans % 06/02/2017 0.3  0.0 - 0.6 % Final   • Neutrophils, Absolute 06/02/2017 6.60  1.50 - 8.30 10*3/mm3 Final   • Lymphocytes, Absolute 06/02/2017 0.55* 0.60 - 4.80 10*3/mm3 Final   • Monocytes, Absolute 06/02/2017 0.56  0.00 - 1.00 10*3/mm3 Final   • Eosinophils,  Absolute 06/02/2017 0.10  0.10 - 0.30 10*3/mm3 Final   • Basophils, Absolute 06/02/2017 0.02  0.00 - 0.20 10*3/mm3 Final   • Immature Grans, Absolute 06/02/2017 0.02  0.00 - 0.03 10*3/mm3 Final   Hospital Outpatient Visit on 06/02/2017   Component Date Value Ref Range Status   • Glucose 06/02/2017 89  70 - 130 mg/dL Final       Assessment/Plan     1.  Recurrent mantle cell lymphoma  2. Mild fatigue    Discussion: He has many hesitations about the ibrutinib not the least of which is the financial burden on him which may be $3000 a month!  That's his part.  We are working on that.  He is also concerned about the arrhythmia, diarrhea, etc. all of which we've gone over in detail and this in a gentleman who had not tolerated even Rituxan previously.  Obviously there are other chemotherapeutic options including Revlimid or other chemotherapies but he has great hesitations in excepting any side effects and is not totally sold on whether he wants to try this but he is going to proceed with trying to get the drug procured and see how he tolerates it.  I have signed the order to get his routine labs and have added a TSH and cortisol to his routine labs and we will see him back in a few weeks to see if he is tolerating or at least has procured this.  He understands that this is palliative therapy at best and presently he has no symptoms so needless to say were really not palliating symptoms but given the relatively rapid growth of this mediastinal process he is likely to develop symptoms in the near term.  Radiation alone to the mediastinum might help symptomatically with minimal side effects but this tends to be an overwhelmingly systemic disease and not localized just to the mediastinum.  We'll see him back in a few weeks to see where we stand with the procurement and finances and toleration of ibrutinib.      Manuel Sosa MD    06/15/2017

## 2017-06-21 NOTE — TELEPHONE ENCOUNTER
Due to patient not starting medication due to taking some time to get financial assistance.  Medication will not be delivered till tomorrow.  Educated for patient to do labs first of next week with follow up appointment on Thursday of next week.  Patient verbalized understanding.

## 2017-06-21 NOTE — TELEPHONE ENCOUNTER
----- Message from Stephanie Buchanan sent at 6/21/2017 12:33 PM EDT -----  Regarding: nasir     Contact: [phone]  Pt needs a rtn call to let him know when he is to do his lab work after taking imbruvica

## 2017-06-23 NOTE — PLAN OF CARE
Oral Chemotherapy Teaching      Patient Name/:  Mitchell Miller   1940  Oral Chemotherapy Regimen:  Ibrutinib 560mg (4 tabs) PO daily  Date Started Medication: 17    Initial Teaching Follow Up Comments     Safety     Storage instructions (away from children; away from heat/cold, sunlight, or moisture), handling - use of gloves (caregivers), washing hands after touching pills, managing waste     “How are you storing your medications?”, reminders on storage, proper handling (caregivers using gloves, washing hands, away from children, managing waste, etc.), disposal of medication with D/C or dosage change    Discussed proper storage of medication in cool, dry, safe environment.  Discussed proper handling with hand washing (he handles his own medication).  Advised if any one else handles med to wear disposal gloves. Discussed it is okay to share a toilet area but when someone cleans they need to wear gloves.  Discussed wearing gloves when cleaning up bodily fluids (stool, emesis, etc.).  Discussed safe sex practices.       Adherence      patient and/or caregiver on how to take medication, take with/without food, assess their adherence potential, stress importance of adherence, ways to manage adherence (pill boxes, phone reminders, calendars), what to do if miss a dose   “How are you taking your medication?” “How are you remembering to take your medication?”, “How many doses have you missed?”, determine reasons for non-adherence (not remembering, side effects, etc), ways to improve, overadherence? Remind patient of ways to improve/maintain adherence   Stressed importance of adherence.  Discussed what to do if he misses a pill (>12 hours, skip dose).  Mailed patient a personalized calendar to track his medications.  Discussed storing this medication in own pill box if determine to use a pill box.     Side Effects/Adverse Reactions      patient on potential side effects, s/s, ways to manage, when  to call MD/seek help     Determine if patient experiencing side effects, ways to manage  Discussed most common ADRs:   thrombocytopenia, diarrhea, neutropenia, anemia, fatigue, musculoskeletal pain, peripheral edema, upper respiratory tract infection, nausea, bruising, dyspnea, constipation, rash, fever, abdominal pain, vomiting and decreased appetite             Miscellaneous     Food interactions, DDIs, financial issues Determine if patient started any new medications since being placed on oral chemo (analyze for DDI) Discussed no grapefruit or grapefruit juice.     Additional Notes:  Mailed patient with PO chemo booklet, ibrutinib education sheets, personalized calendar, and my business card.

## 2017-06-29 PROBLEM — Z51.81 ENCOUNTER FOR THERAPEUTIC DRUG LEVEL MONITORING: Status: ACTIVE | Noted: 2017-01-01

## 2017-06-29 NOTE — PLAN OF CARE
Oral Chemotherapy Teaching      Patient Name/:  Mitchell Miller   1940  Oral Chemotherapy Regimen:  Ibrutinib 560mg PO daily  Date Started Medication: Cycle 1: 17 (Today Day 7)    Initial Teaching Follow Up Comments     Safety     Storage instructions (away from children; away from heat/cold, sunlight, or moisture), handling - use of gloves (caregivers), washing hands after touching pills, managing waste     “How are you storing your medications?”, reminders on storage, proper handling (caregivers using gloves, washing hands, away from children, managing waste, etc.), disposal of medication with D/C or dosage change    Patient is storing medication in safe environment.     Adherence      patient and/or caregiver on how to take medication, take with/without food, assess their adherence potential, stress importance of adherence, ways to manage adherence (pill boxes, phone reminders, calendars), what to do if miss a dose   “How are you taking your medication?” “How are you remembering to take your medication?”, “How many doses have you missed?”, determine reasons for non-adherence (not remembering, side effects, etc), ways to improve, overadherence? Remind patient of ways to improve/maintain adherence   He has not missed any doses since starting treatment. He is taking in the evening.      Side Effects/Adverse Reactions      patient on potential side effects, s/s, ways to manage, when to call MD/seek help     Determine if patient experiencing side effects, ways to manage  Patient's main complaint is fatigue.  Discussed staying as active as possible.  His appetite has decreased slightly and he gets wilson more quickly.  He is not too concerned about this at the time.  I did mention to him that we have a dietician on staff that could call him and offer advice - he would like to hold off on this for now.  He has had 2 episodes of nausea since starting but they both resolved without the need for  antinausea medications.  No issues with diarrhea or constipation.     Miscellaneous     Food interactions, DDIs, financial issues Determine if patient started any new medications since being placed on oral chemo (analyze for DDI)      Additional Notes:  I will F/U with patient in 2 weeks.  Advised him to call me in the meantime with any questions/concerns.

## 2017-06-29 NOTE — PROGRESS NOTES
CHIEF COMPLAINT: Management of mantle cell lymphoma    Problem List:  Oncology/Hematology History    1. Stage IV mantle cell lymphoma: PET scan 01/28/2015 showing diffuse  extensive adenopathy from the lower neck to the proximal thighs with massive  enlargement of the spleen as well. Bone marrow shows nodular involvement of  about 33% of the bone marrow. Echocardiogram 02/02/2015 ejection fraction  greater then 55%. Colonoscopy 02/03/2015 revealed diverticulosis and some  internal hemorrhoids but no other abnormalities. Left axillary node core biopsy  01/14/2015 diagnostic for pleomorphic variant of mantle cell lymphoma.   a) Started bendamustine and Rituxan 02/05/2015, day 2 on 02/06/2015.  b) After 2 courses of bendamustine and Rituxan PET scan of 03/20/2015 showed  virtual complete remission save for a new very small 1 cm nodule posterior  medial right mid to lower lung slightly hypermetabolic but below malignant  thresholds and could be inflammatory, and the rest of his disease is  essentially gone. Saw Dr. Key and while not dismissing the possibility of  transplant out of hand in general it would be a relatively high-risk situation  in this 74-year-old, and the patient, after hearing in great detail would be  involved with the transplant, does not want to consider that.   c) PET scan 06/02/2015 following 4 courses of bendamustine and Rituxan  entirely negative.   d) Repeat bone marrow 06/05/2015 showed no evidence of residual disease.  e) Restaging CT of the neck, chest, abdomen, and pelvis 02/24/2016 showed some  sinusitis and cholelithiasis but no lymphoma. Hemoglobin 16.3, with white count  9200, platelets 155,000, with normal differential, and creatinine of 1.1, GFR  greater than 59, AST 25, ALT 51, alkaline phosphatase 103, and normal  Electrolytes.  f.)  CAT scans October 2016 negative despite bitter complaints of diffuse bone pain on maintenance Rituxan.  October 2016 PET scan also negative for  malignancy.  Small nodule in the lung base that needs to be followed.  This was seen on prior imaging as mentioned above.  G.) PET/CT 1/26/17 shows a small vertex right of midline scalp abnormality and otherwise no abnormalities including resolution of prior right lower lobe lung nodule  H.) PET/CT with progression of disease, new area of recurrence in the paraesophageal region and subcarinal region.    2.  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  10/24/2014 bone marrow biopsy was negative with normal cellular bone marrow with maturing trilineage hematopoiesis.  No morphologic, immunohistochemical or flow cytometric evidence of lymphoma.  Cytogenetics revealed normal male karyotype.      6/2/2017 Imaging    PET/CT IMPRESSION:  Progression of disease with new area of recurrence  identified in the paraesophageal region and subcarinal region where  there is a large hypermetabolic mass present. The remainder of the  PET/CT scan is stable and unremarkable.      6/2/2017 Progression    On PET/CT      6/22/2017 -  Chemotherapy    OP LYMPHOMA Ibrutinib 560 mg           HISTORY OF PRESENT ILLNESS:  The patient is a 76 y.o. male, here for follow up on management of Management      Past Medical History:   Diagnosis Date   • Aches     Smokes marijuana to help with chronic insomnia and aches that occur after the Rituxan   • Cataract     Hx of cataracts.   • Chronic bronchitis    • Chronic insomnia     Smokes marijuana to help with chronic insomnia and aches that occur after the Rituxan. Has tried sleeping medicines and they do not help.Marijuana helps, self medicates.   • Disease of thyroid gland    •  Glaucoma     And therefore cannot take Benadryl   • History of hypothyroidism     On Synthroid   • Hx of renal insufficiency syndrome    • Hyperlipidemia    • Hypertension     Hx of   • Mantle cell lymphoma     Axilla and upper limb   • Myocardial infarction     Apparent silent MI in the past according to patient's report to him of prior EKGs and cardiac cath showing no need for stenting but diffuse less than 50% narrowing.   • PVD (peripheral vascular disease)     With gangrene right hand after right brachial vascular surgery bypass with subsequent redo of that bypass in [place] in 1995.   • Renal insufficiency     Hx of. With current normal renal function pretreatment.   • Sinus congestion     Occasional without fevers or chills     Past Surgical History:   Procedure Laterality Date   • BONE MARROW BIOPSY     • COLONOSCOPY     • HAND SURGERY Left     Hx of left hand reattachment, traumatic removal, and subsequently reattachment.   • TONSILLECTOMY     • VASCULAR SURGERY Right 1995    Brachial vascular surgery bypass with subsequent redo of that bypass in [place] in 1995       Allergies   Allergen Reactions   • Antihistamines, Diphenhydramine-Type    • Benadryl [Diphenhydramine Hcl (Sleep)]      Cannot take Benadryl or many other sleeping meds because of glaucoma.   • Codeine    • Diphenhydramine        Family History and Social History reviewed and changed as necessary      REVIEW OF SYSTEM:   Review of Systems   Constitutional: Negative for appetite change, chills, diaphoresis, fatigue, fever and unexpected weight change.   HENT:   Negative for mouth sores, sore throat and trouble swallowing.    Eyes: Negative for icterus.   Respiratory: Negative for cough, hemoptysis and shortness of breath.    Cardiovascular: Negative for chest pain, leg swelling and palpitations.   Gastrointestinal: Negative for abdominal distention, abdominal pain, blood in stool, constipation, diarrhea, nausea and vomiting.   Endocrine:  "Negative for hot flashes.   Genitourinary: Negative for bladder incontinence, difficulty urinating, dysuria, frequency and hematuria.    Musculoskeletal: Negative for gait problem, neck pain and neck stiffness.   Skin: Negative for rash.   Neurological: Negative for dizziness, gait problem, headaches, light-headedness and numbness.   Hematological: Negative for adenopathy. Does not bruise/bleed easily.   Psychiatric/Behavioral: Negative for depression. The patient is not nervous/anxious.    All other systems reviewed and are negative.       PHYSICAL EXAM    Vitals:    06/29/17 1025   BP: 146/78   Pulse: 66   Resp: 20   Temp: 97.3 °F (36.3 °C)   Weight: 283 lb (128 kg)   Height: 73.5\" (186.7 cm)     Constitutional: Appears well-developed and well-nourished. No distress.   ECOG: (1) Restricted in physically strenuous activity, ambulatory and able to do work of light nature  HENT:   Head: Normocephalic.   Mouth/Throat: Oropharynx is clear and moist.   Eyes: Conjunctivae are normal. Pupils are equal, round, and reactive to light. No scleral icterus.   Neck: Neck supple. No JVD present. No thyromegaly present.   Cardiovascular: Normal rate, regular rhythm and normal heart sounds.    Pulmonary/Chest: Breath sounds normal. No respiratory distress.   Abdominal: Soft. Exhibits no distension and no mass. There is no hepatosplenomegaly. There is no tenderness. There is no rebound and no guarding.   Musculoskeletal:Exhibits no edema, tenderness or deformity.   Neurological: Alert and oriented to person, place, and time. Exhibits normal muscle tone.   Skin: No ecchymosis, no petechiae and no rash noted. Not diaphoretic. No cyanosis. Nails show no clubbing.   Psychiatric: Normal mood and affect.   Vitals reviewed.      Orders Only on 06/26/2017   Component Date Value Ref Range Status   • TSH 06/26/2017 7.090* 0.450 - 4.500 uIU/mL Final   • Cortisol - AM 06/26/2017 10.6  6.2 - 19.4 ug/dL Final   Hospital Outpatient Visit on " 06/02/2017   Component Date Value Ref Range Status   • Glucose 06/02/2017 80  70 - 100 mg/dL Final   • BUN 06/02/2017 15  9 - 23 mg/dL Final   • Creatinine 06/02/2017 1.00  0.60 - 1.30 mg/dL Final   • Sodium 06/02/2017 141  132 - 146 mmol/L Final   • Potassium 06/02/2017 4.4  3.5 - 5.5 mmol/L Final   • Chloride 06/02/2017 103  99 - 109 mmol/L Final   • CO2 06/02/2017 28.0  20.0 - 31.0 mmol/L Final   • Calcium 06/02/2017 9.7  8.7 - 10.4 mg/dL Final   • Total Protein 06/02/2017 6.4  5.7 - 8.2 g/dL Final   • Albumin 06/02/2017 4.30  3.20 - 4.80 g/dL Final   • ALT (SGPT) 06/02/2017 24  7 - 40 U/L Final   • AST (SGOT) 06/02/2017 22  0 - 33 U/L Final   • Alkaline Phosphatase 06/02/2017 91  25 - 100 U/L Final   • Total Bilirubin 06/02/2017 1.9* 0.3 - 1.2 mg/dL Final   • eGFR Non African Amer 06/02/2017 73  >60 mL/min/1.73 Final   • Globulin 06/02/2017 2.1  gm/dL Final   • A/G Ratio 06/02/2017 2.0  1.5 - 2.5 g/dL Final   • BUN/Creatinine Ratio 06/02/2017 15.0  7.0 - 25.0 Final   • Anion Gap 06/02/2017 10.0  3.0 - 11.0 mmol/L Final   • WBC 06/02/2017 7.85  3.50 - 10.80 10*3/mm3 Final   • RBC 06/02/2017 4.89  4.20 - 5.76 10*6/mm3 Final   • Hemoglobin 06/02/2017 15.7  13.1 - 17.5 g/dL Final   • Hematocrit 06/02/2017 47.3  38.9 - 50.9 % Final   • MCV 06/02/2017 96.7  80.0 - 99.0 fL Final   • MCH 06/02/2017 32.1* 27.0 - 31.0 pg Final   • MCHC 06/02/2017 33.2  32.0 - 36.0 g/dL Final   • RDW 06/02/2017 14.2  11.3 - 14.5 % Final   • RDW-SD 06/02/2017 50.2  37.0 - 54.0 fl Final   • MPV 06/02/2017 10.0  6.0 - 12.0 fL Final   • Platelets 06/02/2017 160  150 - 450 10*3/mm3 Final   • Neutrophil % 06/02/2017 84.0* 41.0 - 71.0 % Final   • Lymphocyte % 06/02/2017 7.0* 24.0 - 44.0 % Final   • Monocyte % 06/02/2017 7.1  0.0 - 12.0 % Final   • Eosinophil % 06/02/2017 1.3  0.0 - 3.0 % Final   • Basophil % 06/02/2017 0.3  0.0 - 1.0 % Final   • Immature Grans % 06/02/2017 0.3  0.0 - 0.6 % Final   • Neutrophils, Absolute 06/02/2017 6.60  1.50 -  8.30 10*3/mm3 Final   • Lymphocytes, Absolute 06/02/2017 0.55* 0.60 - 4.80 10*3/mm3 Final   • Monocytes, Absolute 06/02/2017 0.56  0.00 - 1.00 10*3/mm3 Final   • Eosinophils, Absolute 06/02/2017 0.10  0.10 - 0.30 10*3/mm3 Final   • Basophils, Absolute 06/02/2017 0.02  0.00 - 0.20 10*3/mm3 Final   • Immature Grans, Absolute 06/02/2017 0.02  0.00 - 0.03 10*3/mm3 Final   Hospital Outpatient Visit on 06/02/2017   Component Date Value Ref Range Status   • Glucose 06/02/2017 89  70 - 130 mg/dL Final       Assessment/Plan     1.  Mantle cell lymphoma  2. Fatigue    Discussion: He is fatigued only mildly so although a little more than before he started the treatment.  He's been on ibrutinib per week now.  We will monitor his TSH as well as CBC and CMP periodically per protocol.  TSH and cortisol presently normal.  Fatigue unrelated to your thyroid or adrenal issues.  No hint of atrial fibrillation.  No signs or symptoms of bleeding infection or encephalopathy.  Presently he is getting the drug entirely covered but this will probably only last for a month and a half and then I'm not sure where we will go from there.  The main toxicity ultimately may be financial.      Manuel Sosa MD    06/29/2017

## 2017-07-12 NOTE — PLAN OF CARE
Oral Chemotherapy Teaching      Patient Name/:  Mitchell Miller   1940  Oral Chemotherapy Regimen:  Ibrutinib 560 mg PO daily  Date Started Medication: 17, Day 21    Initial Teaching Follow Up Comments     Safety     Storage instructions (away from children; away from heat/cold, sunlight, or moisture), handling - use of gloves (caregivers), washing hands after touching pills, managing waste     “How are you storing your medications?”, reminders on storage, proper handling (caregivers using gloves, washing hands, away from children, managing waste, etc.), disposal of medication with D/C or dosage change    Storing medication in stock bottle. Washes hands before and after handling medication.     Adherence      patient and/or caregiver on how to take medication, take with/without food, assess their adherence potential, stress importance of adherence, ways to manage adherence (pill boxes, phone reminders, calendars), what to do if miss a dose   “How are you taking your medication?” “How are you remembering to take your medication?”, “How many doses have you missed?”, determine reasons for non-adherence (not remembering, side effects, etc), ways to improve, overadherence? Remind patient of ways to improve/maintain adherence   Takes medication (dose and frequency) as prescribed.  On one occasion (at beginning of Tx), patient could not remember if he had taken his oral chemotherapy or not; waited until the next day and resumed as he did not want to double-up on the dose. Otherwise adherent. Discussed that if he knows he forgot the medication to take if w/in 12 hrs of missed dose.      Side Effects/Adverse Reactions      patient on potential side effects, s/s, ways to manage, when to call MD/seek help     Determine if patient experiencing side effects, ways to manage  Primary c/o fatigue. Initially had decrease in appetite but is improving. Denies any bleeding or black/tarry stools. No S/Sx of  infxn. No constipation or diarrhea.     Miscellaneous     Food interactions, DDIs, financial issues Determine if patient started any new medications since being placed on oral chemo (analyze for DDI) No new medications     Additional Notes:        Carmenza Wilson, PharmD  Pharmacy Resident  7/12/2017  5:55 PM

## 2017-08-11 NOTE — PROGRESS NOTES
CHIEF COMPLAINT: Current mantle cell lymphoma   Problem List:  Oncology/Hematology History    1. Stage IV mantle cell lymphoma: PET scan 01/28/2015 showing diffuse  extensive adenopathy from the lower neck to the proximal thighs with massive  enlargement of the spleen as well. Bone marrow shows nodular involvement of  about 33% of the bone marrow. Echocardiogram 02/02/2015 ejection fraction  greater then 55%. Colonoscopy 02/03/2015 revealed diverticulosis and some  internal hemorrhoids but no other abnormalities. Left axillary node core biopsy  01/14/2015 diagnostic for pleomorphic variant of mantle cell lymphoma.   a) Started bendamustine and Rituxan 02/05/2015, day 2 on 02/06/2015.  b) After 2 courses of bendamustine and Rituxan PET scan of 03/20/2015 showed  virtual complete remission save for a new very small 1 cm nodule posterior  medial right mid to lower lung slightly hypermetabolic but below malignant  thresholds and could be inflammatory, and the rest of his disease is  essentially gone. Saw Dr. Key and while not dismissing the possibility of  transplant out of hand in general it would be a relatively high-risk situation  in this 74-year-old, and the patient, after hearing in great detail would be  involved with the transplant, does not want to consider that.   c) PET scan 06/02/2015 following 4 courses of bendamustine and Rituxan  entirely negative.   d) Repeat bone marrow 06/05/2015 showed no evidence of residual disease.  e) Restaging CT of the neck, chest, abdomen, and pelvis 02/24/2016 showed some  sinusitis and cholelithiasis but no lymphoma. Hemoglobin 16.3, with white count  9200, platelets 155,000, with normal differential, and creatinine of 1.1, GFR  greater than 59, AST 25, ALT 51, alkaline phosphatase 103, and normal  Electrolytes.  f.)  CAT scans October 2016 negative despite bitter complaints of diffuse bone pain on maintenance Rituxan.  October 2016 PET scan also negative for malignancy.   Small nodule in the lung base that needs to be followed.  This was seen on prior imaging as mentioned above.  G.) PET/CT 1/26/17 shows a small vertex right of midline scalp abnormality and otherwise no abnormalities including resolution of prior right lower lobe lung nodule  H.) PET/CT with progression of disease, new area of recurrence in the paraesophageal region and subcarinal region.    2.  Squamous cell scalp lesion        Mantle cell lymphoma of lymph nodes of axilla    1/14/2015 Initial Diagnosis     Mantle cell lymphoma of lymph nodes of axilla         2/5/2015 - 5/8/2015 Chemotherapy     Pako Rituxan X 4 ended 5/8/15         7/10/2015 - 9/2/2016 Chemotherapy     OP LYMPHOMA RiTUXimab (every 2 months)           10/24/2016 Procedure     Bone marrow biopsy negative.  10/24/2014 bone marrow biopsy was negative with normal cellular bone marrow with maturing trilineage hematopoiesis.  No morphologic, immunohistochemical or flow cytometric evidence of lymphoma.  Cytogenetics revealed normal male karyotype.         6/2/2017 Imaging     PET/CT IMPRESSION:  Progression of disease with new area of recurrence  identified in the paraesophageal region and subcarinal region where  there is a large hypermetabolic mass present. The remainder of the  PET/CT scan is stable and unremarkable.         6/2/2017 Progression     On PET/CT         6/22/2017 -  Chemotherapy     OP LYMPHOMA Ibrutinib 560 mg              HISTORY OF PRESENT ILLNESS:  The patient is a 76 y.o. male, here for follow up on management of Mantle cell lymphoma.  Tolerating Ibrance wonderfully with essentially no side effects other than he has a mildly queasy stomach for which she's been taking ondansetron effectively.      Past Medical History:   Diagnosis Date   • Aches     Smokes marijuana to help with chronic insomnia and aches that occur after the Rituxan   • Cataract     Hx of cataracts.   • Chronic bronchitis    • Chronic insomnia     Smokes marijuana  to help with chronic insomnia and aches that occur after the Rituxan. Has tried sleeping medicines and they do not help.Marijuana helps, self medicates.   • Disease of thyroid gland    • Glaucoma     And therefore cannot take Benadryl   • History of hypothyroidism     On Synthroid   • Hx of renal insufficiency syndrome    • Hyperlipidemia    • Hypertension     Hx of   • Mantle cell lymphoma     Axilla and upper limb   • Myocardial infarction     Apparent silent MI in the past according to patient's report to him of prior EKGs and cardiac cath showing no need for stenting but diffuse less than 50% narrowing.   • PVD (peripheral vascular disease)     With gangrene right hand after right brachial vascular surgery bypass with subsequent redo of that bypass in [place] in 1995.   • Renal insufficiency     Hx of. With current normal renal function pretreatment.   • Sinus congestion     Occasional without fevers or chills     Past Surgical History:   Procedure Laterality Date   • BONE MARROW BIOPSY     • COLONOSCOPY     • HAND SURGERY Left     Hx of left hand reattachment, traumatic removal, and subsequently reattachment.   • TONSILLECTOMY     • VASCULAR SURGERY Right 1995    Brachial vascular surgery bypass with subsequent redo of that bypass in [place] in 1995       Allergies   Allergen Reactions   • Antihistamines, Diphenhydramine-Type    • Benadryl [Diphenhydramine Hcl (Sleep)]      Cannot take Benadryl or many other sleeping meds because of glaucoma.   • Codeine    • Diphenhydramine        Family History and Social History reviewed and changed as necessary      REVIEW OF SYSTEM:   Review of Systems   Constitutional: Negative for appetite change, chills, diaphoresis, fatigue, fever and unexpected weight change.   HENT:   Negative for mouth sores, sore throat and trouble swallowing.    Eyes: Negative for icterus.   Respiratory: Negative for cough, hemoptysis and shortness of breath.    Cardiovascular: Negative for  "chest pain, leg swelling and palpitations.   Gastrointestinal: Negative for abdominal distention, abdominal pain, blood in stool, constipation, diarrhea, nausea and vomiting.   Endocrine: Negative for hot flashes.   Genitourinary: Negative for bladder incontinence, difficulty urinating, dysuria, frequency and hematuria.    Musculoskeletal: Negative for gait problem, neck pain and neck stiffness.   Skin: Negative for rash.   Neurological: Negative for dizziness, gait problem, headaches, light-headedness and numbness.   Hematological: Negative for adenopathy. Does not bruise/bleed easily.   Psychiatric/Behavioral: Negative for depression. The patient is not nervous/anxious.    All other systems reviewed and are negative.       PHYSICAL EXAM    Vitals:    08/11/17 1123   BP: 178/93   Pulse: 64   Resp: 16   Temp: 97.5 °F (36.4 °C)   Weight: 292 lb (132 kg)   Height: 73.5\" (186.7 cm)     Constitutional: Appears well-developed and well-nourished. No distress.   ECOG: (2) Ambulatory and capable of self care, unable to carry out work activity, up and about > 50% or waking hours  HENT:   Head: Normocephalic.   Mouth/Throat: Oropharynx is clear and moist.   Eyes: Conjunctivae are normal. Pupils are equal, round, and reactive to light. No scleral icterus.   Neck: Neck supple. No JVD present. No thyromegaly present.   Cardiovascular: Normal rate, regular rhythm and normal heart sounds.    Pulmonary/Chest: Breath sounds normal. No respiratory distress.   Abdominal: Soft. Exhibits no distension and no mass. There is no hepatosplenomegaly. There is no tenderness. There is no rebound and no guarding.   Musculoskeletal:Exhibits no edema, tenderness or deformity.   Neurological: Alert and oriented to person, place, and time. Exhibits normal muscle tone.   Skin: No ecchymosis, no petechiae and no rash noted. Not diaphoretic. No cyanosis. Nails show no clubbing.   Psychiatric: Normal mood and affect.   Vitals reviewed.      Orders Only " on 08/02/2017   Component Date Value Ref Range Status   • WBC 08/02/2017 8.5  3.4 - 10.8 x10E3/uL Final   • RBC 08/02/2017 4.88  4.14 - 5.80 x10E6/uL Final   • Hemoglobin 08/02/2017 16.0  12.6 - 17.7 g/dL Final   • Hematocrit 08/02/2017 45.0  37.5 - 51.0 % Final   • MCV 08/02/2017 92  79 - 97 fL Final   • MCH 08/02/2017 32.8  26.6 - 33.0 pg Final   • MCHC 08/02/2017 35.6  31.5 - 35.7 g/dL Final   • RDW 08/02/2017 14.7  12.3 - 15.4 % Final   • Platelets 08/02/2017 125* 150 - 379 x10E3/uL Final   • Neutrophil Rel % 08/02/2017 84  % Final   • Lymphocyte Rel % 08/02/2017 7  % Final   • Monocyte Rel % 08/02/2017 7  % Final   • Eosinophil Rel % 08/02/2017 1  % Final   • Basophil Rel % 08/02/2017 0  % Final   • Neutrophils Absolute 08/02/2017 7.2* 1.4 - 7.0 x10E3/uL Final   • Lymphocytes Absolute 08/02/2017 0.6* 0.7 - 3.1 x10E3/uL Final   • Monocytes Absolute 08/02/2017 0.6  0.1 - 0.9 x10E3/uL Final   • Eosinophils Absolute 08/02/2017 0.1  0.0 - 0.4 x10E3/uL Final   • Basophils Absolute 08/02/2017 0.0  0.0 - 0.2 x10E3/uL Final   • Immature Granulocyte Rel % 08/02/2017 1  % Final   • Immature Grans Absolute 08/02/2017 0.1  0.0 - 0.1 x10E3/uL Final   • Hematology Comments: 08/02/2017 Note:   Final    Comment: Cellular Degeneration Noted.  Verified by microscopic examination.     • Glucose 08/02/2017 121* 65 - 99 mg/dL Final   • BUN 08/02/2017 14  8 - 27 mg/dL Final   • Creatinine 08/02/2017 1.26  0.76 - 1.27 mg/dL Final   • eGFR Non  Am 08/02/2017 56* >59 mL/min/1.73 Final   • eGFR African Am 08/02/2017 65  >59 mL/min/1.73 Final   • BUN/Creatinine Ratio 08/02/2017 11  10 - 24 Final   • Sodium 08/02/2017 144  134 - 144 mmol/L Final   • Potassium 08/02/2017 4.0  3.5 - 5.2 mmol/L Final   • Chloride 08/02/2017 101  96 - 106 mmol/L Final   • Total CO2 08/02/2017 24  18 - 29 mmol/L Final   • Calcium 08/02/2017 9.2  8.6 - 10.2 mg/dL Final   • Total Protein 08/02/2017 6.2  6.0 - 8.5 g/dL Final   • Albumin 08/02/2017 4.3  3.5  - 4.8 g/dL Final   • Globulin 08/02/2017 1.9  1.5 - 4.5 g/dL Final   • A/G Ratio 08/02/2017 2.3* 1.2 - 2.2 Final   • Total Bilirubin 08/02/2017 2.2* 0.0 - 1.2 mg/dL Final   • Alkaline Phosphatase 08/02/2017 76  39 - 117 IU/L Final   • AST (SGOT) 08/02/2017 16  0 - 40 IU/L Final   • ALT (SGPT) 08/02/2017 11  0 - 44 IU/L Final   • Dre Shepherd CMP14 Default 08/02/2017 Comment   Final    Comment: A hand-written panel/profile was received from your office. In  accordance with the [facility] Ambiguous Test Code Policy dated July 2003, we have completed your order by using the closest currently  or formerly recognized AMA panel.  We have assigned Comprehensive  Metabolic Panel (14), Test Code #870864 to this request.  If this  is not the testing you wished to receive on this specimen, please  contact the [facility] Client Inquiry/Technical Services Department  to clarify the test order.  We appreciate your business.     Orders Only on 07/18/2017   Component Date Value Ref Range Status   • WBC 07/18/2017 8.8  3.4 - 10.8 x10E3/uL Final   • RBC 07/18/2017 4.76  4.14 - 5.80 x10E6/uL Final   • Hemoglobin 07/18/2017 15.2  12.6 - 17.7 g/dL Final   • Hematocrit 07/18/2017 43.8  37.5 - 51.0 % Final   • MCV 07/18/2017 92  79 - 97 fL Final   • MCH 07/18/2017 31.9  26.6 - 33.0 pg Final   • MCHC 07/18/2017 34.7  31.5 - 35.7 g/dL Final   • RDW 07/18/2017 14.7  12.3 - 15.4 % Final   • Platelets 07/18/2017 126* 150 - 379 x10E3/uL Final   • Neutrophil Rel % 07/18/2017 83  % Final   • Lymphocyte Rel % 07/18/2017 12  % Final   • Monocyte Rel % 07/18/2017 4  % Final   • Eosinophil Rel % 07/18/2017 1  % Final   • Basophil Rel % 07/18/2017 0  % Final   • Neutrophils Absolute 07/18/2017 7.3* 1.4 - 7.0 x10E3/uL Final   • Lymphocytes Absolute 07/18/2017 1.0  0.7 - 3.1 x10E3/uL Final   • Monocytes Absolute 07/18/2017 0.3  0.1 - 0.9 x10E3/uL Final   • Eosinophils Absolute 07/18/2017 0.1  0.0 - 0.4 x10E3/uL Final   • Basophils Absolute 07/18/2017 0.0   0.0 - 0.2 x10E3/uL Final   • Immature Granulocyte Rel % 07/18/2017 0  % Final   • Immature Grans Absolute 07/18/2017 0.0  0.0 - 0.1 x10E3/uL Final   • Glucose 07/18/2017 91  65 - 99 mg/dL Final   • BUN 07/18/2017 12  8 - 27 mg/dL Final   • Creatinine 07/18/2017 1.13  0.76 - 1.27 mg/dL Final   • eGFR Non  Am 07/18/2017 64  >59 mL/min/1.73 Final   • eGFR African Am 07/18/2017 74  >59 mL/min/1.73 Final   • BUN/Creatinine Ratio 07/18/2017 11  10 - 24 Final   • Sodium 07/18/2017 143  134 - 144 mmol/L Final   • Potassium 07/18/2017 4.2  3.5 - 5.2 mmol/L Final   • Chloride 07/18/2017 101  96 - 106 mmol/L Final   • Total CO2 07/18/2017 24  18 - 29 mmol/L Final   • Calcium 07/18/2017 9.1  8.6 - 10.2 mg/dL Final   • Total Protein 07/18/2017 6.0  6.0 - 8.5 g/dL Final   • Albumin 07/18/2017 4.3  3.5 - 4.8 g/dL Final   • Globulin 07/18/2017 1.7  1.5 - 4.5 g/dL Final   • A/G Ratio 07/18/2017 2.5* 1.2 - 2.2 Final   • Total Bilirubin 07/18/2017 1.7* 0.0 - 1.2 mg/dL Final   • Alkaline Phosphatase 07/18/2017 70  39 - 117 IU/L Final   • AST (SGOT) 07/18/2017 12  0 - 40 IU/L Final   • ALT (SGPT) 07/18/2017 11  0 - 44 IU/L Final       Assessment/Plan     1.  Recurrent mantle cell lymphoma  2. Nausea    Discussion: I have reviewed his labs from August 3 and he's doing well with an essentially normal blood counts safer platelets 125,000 and normal CMP save for slightly elevated bilirubin.  No hint of atrial arrhythmia or other toxicities from the ibrutinib.  Seems that the finances of the symptoms sorted out for the immediate future.  We'll plan on rescanning him in mid September prior to return early October.  Manuel Sosa MD    08/11/2017

## 2017-08-29 NOTE — PROGRESS NOTES
Oral Chemotherapy Teaching        Patient Name/:               Mitchell Miller   1940  Oral Chemotherapy Regimen:  Ibrutinib 560 mg PO daily  Date Started Medication: Therapy initiated 2017         Initial Teaching Follow Up Comments      Safety      Storage instructions (away from children; away from heat/cold, sunlight, or moisture), handling - use of gloves (caregivers), washing hands after touching pills, managing waste    “How are you storing your medications?”, reminders on storage, proper handling (caregivers using gloves, washing hands, away from children, managing waste, etc.), disposal of medication with D/C or dosage change        Adherence       patient and/or caregiver on how to take medication, take with/without food, assess their adherence potential, stress importance of adherence, ways to manage adherence (pill boxes, phone reminders, calendars), what to do if miss a dose    “How are you taking your medication?” “How are you remembering to take your medication?”, “How many doses have you missed?”, determine reasons for non-adherence (not remembering, side effects, etc), ways to improve, overadherence? Remind patient of ways to improve/maintain adherence Takes medication (dose and frequency) as prescribed. Reports adherence to medication, reports taking in the afternoon prior to bedtime.  Discussed that if he knows he forgot the medication to take if w/in 12 hrs of missed dose.       Side Effects/Adverse Reactions       patient on potential side effects, s/s, ways to manage, when to call MD/seek help    Determine if patient experiencing side effects, ways to manage  Primary c/o fatigue. Reports that the fatigue is manageable however. Reports that he his tolerating medication better than previous treatment regimens.   Denies any bleeding or black/tarry stools. No S/Sx of infxn. Reports some constipation, however relieved by stool softener and occasional use of laxative  product.        Miscellaneous      Food interactions, DDIs, financial issues Determine if patient started any new medications since being placed on oral chemo (analyze for DDI) No new medications      Additional Notes:  Provided patient my name and contact information with instructions to call if any questions or concerns arise. Pt verbalized understanding. Will plan to follow-up in ~ 4 weeks.

## 2017-09-19 NOTE — TELEPHONE ENCOUNTER
Call from patient transferred directly to clinic extension.  Patient c/o painful swallowing.  He is on imbruvica.   Dr. Sosa spoke with patient and told him we would send rx for MMW.

## 2017-09-25 NOTE — TELEPHONE ENCOUNTER
----- Message from Betty Villanueva sent at 9/25/2017 10:32 AM EDT -----  Regarding: [facility]/PATIENT IN HOSPITAL  Contact: [phone]  Patients wife called and cancelled all patients apt. She told me he is in the hospital at [facility]. I asked to take a message to get more information and she told me there was no need to send you all a message. So I dont know why patient is in the hospital or anything I just thought you all needed to know.

## 2017-09-27 ENCOUNTER — APPOINTMENT (OUTPATIENT)
Dept: PET IMAGING | Facility: HOSPITAL | Age: 77
End: 2017-09-27
Attending: INTERNAL MEDICINE